# Patient Record
Sex: MALE | Race: WHITE
[De-identification: names, ages, dates, MRNs, and addresses within clinical notes are randomized per-mention and may not be internally consistent; named-entity substitution may affect disease eponyms.]

---

## 2018-02-20 ENCOUNTER — HOSPITAL ENCOUNTER (INPATIENT)
Dept: HOSPITAL 25 - ED | Age: 68
LOS: 1 days | Discharge: HOME | DRG: 312 | End: 2018-02-21
Attending: INTERNAL MEDICINE | Admitting: INTERNAL MEDICINE
Payer: MEDICARE

## 2018-02-20 DIAGNOSIS — Z79.899: ICD-10-CM

## 2018-02-20 DIAGNOSIS — F10.21: ICD-10-CM

## 2018-02-20 DIAGNOSIS — R55: Primary | ICD-10-CM

## 2018-02-20 DIAGNOSIS — R57.9: ICD-10-CM

## 2018-02-20 DIAGNOSIS — Z80.9: ICD-10-CM

## 2018-02-20 DIAGNOSIS — I95.9: ICD-10-CM

## 2018-02-20 DIAGNOSIS — E83.42: ICD-10-CM

## 2018-02-20 DIAGNOSIS — R68.0: ICD-10-CM

## 2018-02-20 DIAGNOSIS — Z91.018: ICD-10-CM

## 2018-02-20 DIAGNOSIS — E87.2: ICD-10-CM

## 2018-02-20 LAB
BASOPHILS # BLD AUTO: 0.1 10^3/UL (ref 0–0.2)
EOSINOPHIL # BLD AUTO: 0.2 10^3/UL (ref 0–0.6)
HCT VFR BLD AUTO: 37 % (ref 42–52)
HGB BLD-MCNC: 13.5 G/DL (ref 14–18)
INR PPP/BLD: 0.9 (ref 0.77–1.02)
LYMPHOCYTES # BLD AUTO: 2.3 10^3/UL (ref 1–4.8)
MCH RBC QN AUTO: 32 PG (ref 27–31)
MCHC RBC AUTO-ENTMCNC: 36 G/DL (ref 31–36)
MCV RBC AUTO: 88 FL (ref 80–94)
MONOCYTES # BLD AUTO: 0.8 10^3/UL (ref 0–0.8)
NEUTROPHILS # BLD AUTO: 5.7 10^3/UL (ref 1.5–7.7)
NRBC # BLD AUTO: 0 10^3/UL
NRBC BLD QL AUTO: 0.1
PLATELET # BLD AUTO: 184 10^3/UL (ref 150–450)
RBC # BLD AUTO: 4.2 10^6/UL (ref 4–5.4)
WBC # BLD AUTO: 9 10^3/UL (ref 3.5–10.8)

## 2018-02-20 PROCEDURE — 87651 STREP A DNA AMP PROBE: CPT

## 2018-02-20 PROCEDURE — 70450 CT HEAD/BRAIN W/O DYE: CPT

## 2018-02-20 PROCEDURE — 93306 TTE W/DOPPLER COMPLETE: CPT

## 2018-02-20 PROCEDURE — 87040 BLOOD CULTURE FOR BACTERIA: CPT

## 2018-02-20 PROCEDURE — 84443 ASSAY THYROID STIM HORMONE: CPT

## 2018-02-20 PROCEDURE — 71275 CT ANGIOGRAPHY CHEST: CPT

## 2018-02-20 PROCEDURE — 85379 FIBRIN DEGRADATION QUANT: CPT

## 2018-02-20 PROCEDURE — 85025 COMPLETE CBC W/AUTO DIFF WBC: CPT

## 2018-02-20 PROCEDURE — 71045 X-RAY EXAM CHEST 1 VIEW: CPT

## 2018-02-20 PROCEDURE — 87493 C DIFF AMPLIFIED PROBE: CPT

## 2018-02-20 PROCEDURE — 81015 MICROSCOPIC EXAM OF URINE: CPT

## 2018-02-20 PROCEDURE — 83880 ASSAY OF NATRIURETIC PEPTIDE: CPT

## 2018-02-20 PROCEDURE — 93005 ELECTROCARDIOGRAM TRACING: CPT

## 2018-02-20 PROCEDURE — 87502 INFLUENZA DNA AMP PROBE: CPT

## 2018-02-20 PROCEDURE — 87641 MR-STAPH DNA AMP PROBE: CPT

## 2018-02-20 PROCEDURE — 86140 C-REACTIVE PROTEIN: CPT

## 2018-02-20 PROCEDURE — 84439 ASSAY OF FREE THYROXINE: CPT

## 2018-02-20 PROCEDURE — 72125 CT NECK SPINE W/O DYE: CPT

## 2018-02-20 PROCEDURE — 85384 FIBRINOGEN ACTIVITY: CPT

## 2018-02-20 PROCEDURE — 80307 DRUG TEST PRSMV CHEM ANLYZR: CPT

## 2018-02-20 PROCEDURE — 94760 N-INVAS EAR/PLS OXIMETRY 1: CPT

## 2018-02-20 PROCEDURE — 36415 COLL VENOUS BLD VENIPUNCTURE: CPT

## 2018-02-20 PROCEDURE — 85652 RBC SED RATE AUTOMATED: CPT

## 2018-02-20 PROCEDURE — 82550 ASSAY OF CK (CPK): CPT

## 2018-02-20 PROCEDURE — 84479 ASSAY OF THYROID (T3 OR T4): CPT

## 2018-02-20 PROCEDURE — 81003 URINALYSIS AUTO W/O SCOPE: CPT

## 2018-02-20 PROCEDURE — 84520 ASSAY OF UREA NITROGEN: CPT

## 2018-02-20 PROCEDURE — 80053 COMPREHEN METABOLIC PANEL: CPT

## 2018-02-20 PROCEDURE — 85730 THROMBOPLASTIN TIME PARTIAL: CPT

## 2018-02-20 PROCEDURE — G0480 DRUG TEST DEF 1-7 CLASSES: HCPCS

## 2018-02-20 PROCEDURE — 80048 BASIC METABOLIC PNL TOTAL CA: CPT

## 2018-02-20 PROCEDURE — 82272 OCCULT BLD FECES 1-3 TESTS: CPT

## 2018-02-20 PROCEDURE — 84484 ASSAY OF TROPONIN QUANT: CPT

## 2018-02-20 PROCEDURE — 85610 PROTHROMBIN TIME: CPT

## 2018-02-20 PROCEDURE — 82565 ASSAY OF CREATININE: CPT

## 2018-02-20 PROCEDURE — 83605 ASSAY OF LACTIC ACID: CPT

## 2018-02-20 PROCEDURE — 83735 ASSAY OF MAGNESIUM: CPT

## 2018-02-20 PROCEDURE — 93880 EXTRACRANIAL BILAT STUDY: CPT

## 2018-02-20 PROCEDURE — 80320 DRUG SCREEN QUANTALCOHOLS: CPT

## 2018-02-20 PROCEDURE — 84145 PROCALCITONIN (PCT): CPT

## 2018-02-20 PROCEDURE — 99283 EMERGENCY DEPT VISIT LOW MDM: CPT

## 2018-02-20 PROCEDURE — 83036 HEMOGLOBIN GLYCOSYLATED A1C: CPT

## 2018-02-20 PROCEDURE — 82533 TOTAL CORTISOL: CPT

## 2018-02-20 RX ADMIN — HEPARIN SODIUM SCH UNITS: 5000 INJECTION INTRAVENOUS; SUBCUTANEOUS at 15:14

## 2018-02-20 RX ADMIN — HEPARIN SODIUM SCH UNITS: 5000 INJECTION INTRAVENOUS; SUBCUTANEOUS at 22:47

## 2018-02-20 NOTE — RAD
Indication: Syncope.



CT of the brain was performed without IV contrast. No prior study is available for

comparison.



Ventricular structures are midline. No midline shift is noted. The extra-axial spaces are

unremarkable.



High density areas are noted in the basal ganglia bilaterally consistent with basal

ganglia calcifications. No intracranial mass or hemorrhage is noted.



Mastoid air cells are well aerated. Paranasal sinuses are clear.



IMPRESSION: NO INTRACRANIAL MASS OR HEMORRHAGE IS NOTED.

## 2018-02-20 NOTE — ED
Haven ZEE Thomas, scribed for Shell Tillman MD on 18 at 1027 .





Syncope/Near Syncope





- HPI Summary


HPI Summary: 





The patient is a 67 year old male brought in by ambulance after a syncopal 

episode that occurred when he was hiking up a hill with his wife. Before the 

syncope, the patient was lightheaded for a few seconds. As a result of the 

syncope, he fell and struck his head. He did not have urinary incontinence. Per 

EMS, the patient was coming out a stuporous state when we arrived. He had dry 

heaves prior to arrival, and he is nauseous, diaphoretic, and lightheaded in 

the emergency department. The patient denies aphasia, chest pain, and a 

headache. EMS tracing does not show a STEMI. He did not have the flu shot this 

year. He is not on any medications except Claritin.  Pt is a recovered 

alcoholic for many years, did have black out spells years ago when he was 

drinking.  Had one relapse a few years ago with alcohol, but has been clean for 

years.  Denies alcohol today or recently.  Pt is a vegan, takes vitamins in 

addition to the claritin.  Pt had also walked 3 miles this am before climbing 3 

hills with his wife.  Pt is used to this level of physical activity.  Pt had 

not felt ill prior to this episode. 





- History Of Current Complaint


Hx Obtained From: Patient


Onset/Duration: Sudden Onset, Lasting Minutes, Still Present - he is still 

nauseous, mental status is improved


Timing: Intermittent Episode Lasting


Context: Witnessed


Activity At Onset: Exertion - Hiking hills


Associated Head Trauma: Yes


Aggravating Factor(s): Nothing


Alleviating Factor(s): Spontaneous Resolution


Associated Signs And Symptoms: Diaphoresis, Head Trauma (Recent), 

Lightheadedness, Other - Dry heaves, nauseous; NEGATIVE: aphasia, chest pain, 

headache





- Allergies/Home Medications


Allergies/Adverse Reactions: 


 Allergies











Allergy/AdvReac Type Severity Reaction Status Date / Time


 


strawberry Allergy Severe severe Verified 18 10:57





   facial/head  





   swelling  


 


ENVIRONMENTAL/SEASONAL Allergy  Sneezing Uncoded 04/15/15 12:50





HAYFEVER     











Home Medications: 


 Home Medications





Desloratidine (NF) [Clarinex (NF)] 5 mg PO EVERY OTHER DAY 18 [History 

Confirmed 18]











PMH/Surg Hx/FS Hx/Imm Hx


Previously Healthy: No - recovered alcoholic 


Endocrine/Hematology History: 


   Denies: Hx Diabetes


Cardiovascular History: 


   Denies: Hx Coronary Artery Disease, Hx Hypertension, Hx Myocardial Infarction


Sensory History: Reports: Hx Contacts or Glasses - READING GLASSES


   Denies: Hx Hearing Aid


Opthamlomology History: Reports: Hx Contacts or Glasses - READING GLASSES


Neurological History: 


   Denies: Hx Seizures


Psychiatric History: Reports: Other Psychiatric Issues/Disorders - Hx 

recovering alcoholic





- Surgical History


Surgery Procedure, Year, and Place:  VASECTOMY, OFFICE.   [place]OS[place], 

[facility] [place]  T[place]illectomy


Hx Anesthesia Reactions: Yes - LOCAL - SENSITVE - FLINCH





- Family History


Known Family History: Positive: Cardiac Disease, Other - Mother  of ovarian 

cancer, father alive and well. 





- Social History


Alcohol Use: None


Alcohol Amount: Recovering alcoholic


Substance Use Type: Reports: None


Smoking Status (MU): Former Smoker


Type: Pipe


Amount Used/How Often: PIPE


Have You Smoked in the Last Year: No





Review of Systems


Positive: Skin Diaphoresis


Negative: Chest Pain


Positive: Nausea, Other - Dry heaves


Negative: incontinence


Neurological: Negative - aphasia, Other - Lightheadedness


Positive: Syncope.  Negative: Headache


All Other Systems Reviewed And Are Negative: Yes





Physical Exam





- Summary


Physical Exam Summary: 





Appearance: mildly Ill-appearing, no pain distress, Well-nourished


Skin: Warm, diaphoretic


Head: Normal Head/Face inspection


Eyes: Conjunctiva clear


ENT: Normal inspection


Neck: Supple, no nodes, no JVD.


Respiratory: Lungs clear, Normal breath sounds, no respiratory distress


Cardio: RRR, No murmur, pulses normal, brisk capillary refill


Abdomen: soft, nontender


Bowel sounds: present 


Rectal: There is normal tone. Soft brown stool is present


Musculoskeletal: Strength Intact/ ROM intact. No calf tenderness. No edema.


Neuro: Alert, muscle tone normal, facial symmetry, speech normal, sensory/motor 

intact 


Psychological: Normal


Triage Information Reviewed: Yes


Vital Signs Reviewed: Yes





Diagnostics





- Laboratory


Result Diagrams: 


 18 10:51





 18 12:40


Lab Statement: Any lab studies that have been ordered have been reviewed, and 

results considered in the medical decision making process.





- Radiology


  ** CXR


Xray Interpretation: No Acute Changes - NO ACTIVE CARDIOPULMONARY DISEASE IS 

NOTED. Dr. Tillman has reviewed this report.


Radiology Interpretation Completed By: Radiologist





- CT


  ** CT C-Spine


CT Interpretation: No Acute Changes - Multilevel degenerative disc disease is 

noted. No fracture is noted. Dr. Tillman has reviewed this report.


CT Interpretation Completed By: Radiologist





  ** CT Brain


CT Interpretation: No Acute Changes - NO INTRACRANIAL MASS OR HEMORRHAGE IS 

NOTED. Dr. Tillman has reviewed this report.


CT Interpretation Completed By: Radiologist





- EKG


  ** 10:18


Cardiac Rate: NL


EKG Rhythm: Sinus Rhythm - at 94 BPM


ST Segment: Non-Specific


Ectopy: None


EKG Interpretation: Normal AVIVCD, Normal QTc (490), normal axis. 


EKG Comparison: Other - No prior EKG to compare.





  ** 11:45


Cardiac Rate: NL


EKG Rhythm: Sinus Rhythm - at 67 BPM


ST Segment: Non-Specific


Ectopy: None


EKG Interpretation: Normal AVIVD, Normal QTc, normal axis. 


EKG Comparison: No Significant Change - compared to previous EKG today at 10:18





Re-Evaluation





- Re-Evaluation


  ** First Eval


Re-Evaluation Time: 10:45


Change: Unchanged


Comment: Per nurse, the patient had a soft normal brown BM with no blood 

streaking.





  ** Second Eval


Re-Evaluation Time: 11:39


Change: Unchanged


Comment: The patient notes some epigastric discomfort. I performed a rectal 

examination. There is normal tone and soft brown stool. We will send for Guiac. 

Blood pressure is 87/52. Pulse is 63 BPM.





  ** Third Eval


Re-Evaluation Time: 11:52


Change: Unchanged


Comment: Blood pressure is 80/50. Rectal temperature is 94.





Course/Dx


Course Of Treatment: Patients medications reviewed this visit. Allergies noted.


Assessment/Plan: The patient is a 67 year old male brought in by ambulance 

after a syncopal episode that occurred when he was hiking up a hill with his 

wife. Before the syncope, the patient was lightheaded for a few seconds. As a 

result of the syncope, he fell and struck his head. He is nauseous, diaphoretic

, and lightheaded in the emergency department. The patient was given IV fluids 

and Zofran. Bloodwork and EKG were obtained. Rapid influenza A and B and Rapid 

Strep are negative. CXR, CT Brain, and CT C-Spine are negative for acute 

disease. Dr. Salgado, hospitalist, will admit the patient.





- Diagnoses


Provider Diagnoses: 


 Syncope, Hypotension, Hypothermia








- Physician Notifications


Discussed Care of Patient With: Nalini Salgado


Time Discussed With Above Provider: 11:43


Instructed by Provider To: Other - Dr. Salgado, hospitalist, will admit the 

patient.





- Critical Care Time


Critical Care Time: 30-74 min





Discharge





- Discharge Plan


Condition: Guarded


Disposition: ADMITTED TO [facility] documentation as recorded by the Haven barrios Thomas accurately reflects 

the service I personally performed and the decisions made by me, Shell Tillman MD.

## 2018-02-20 NOTE — RAD
INDICATION: Carotid stenosis     



COMPARISON: None

 

TECHNIQUE: Transverse and longitudinal scans of the carotid and vertebral arteries were

performed with gray scale, color Doppler, and spectral Doppler imaging. Stenosis criteria

is based on flow velocities that correlate with visual internal carotid artery diameter

(NASCET criteria)



FINDINGS:  Right carotid: There is moderate calcific plaque involving the bifurcation.

There is no spectral broadening. The peak systolic velocity of the internal carotid artery

is 153 cm/s and the peak diastolic velocity 32 cm/s. The ICA/CCA ratio is calculated at

0.8. This corresponds to a less than 50% diameter stenosis.



Left carotid: There is no appreciable plaque involving the bifurcation. There is no

spectral broadening. The peak systolic velocity of the internal carotid artery is 98 cm/s

and the peak diastolic velocity 41 cm/s. The ICA/CCA ratio is calculated at 0.7. This

corresponds to a less than 50% diameter stenosis.



Right vertebral: Right vertebral waveforms are mildly dampened. The vessel is smaller in

caliber than is the left vertebral artery. There is antegrade flow.



Left vertebral: Left vertebral waveforms are normal and the flow is antegrade.



IMPRESSION:  NO EVIDENCE OF A HEMODYNAMICALLY SIGNIFICANT STENOSIS. MODERATE RIGHT-SIDED

CALCIFIC PLAQUE FORMATION. 



CPT II Codes: 3100F PQRS

## 2018-02-20 NOTE — RAD
INDICATION: Chest pain. Short of breath. Evaluate for pulmonary embolus. D-dimer less than

200      



COMPARISON: Chest x-ray February 20, 2018

 

TECHNIQUE: Axial source images were obtained from the thoracic inlet to the hemidiaphragms

following administration of 79 cc Omnipaque 350. CT angiographic technique was utilized.

Coronal and sagittal reconstructed images were acquired.



CHEST FINDINGS:



Neck/thyroid: The visualized neck to include the thyroid appear normal.



Chest wall: There are no acute abnormalities of the bony thorax or chest wall. There is no

supraclavicular, infraclavicular, or axillary lymphadenopathy.



Lungs : There are no pulmonary parenchymal masses or infiltrates. There is minor gravity

dependent atelectasis in the lung bases. The pulmonary interstitium appears normal. There

are no endobronchial lesions.



Cardiomediastinal structures: There is no CT evidence of acute pulmonary embolic disease.



The heart is normal in size. There is no pericardial effusion.  There is no evidence of

aortic aneurysm or dissection. There is no mediastinal or hilar adenopathy. The esophagus

appears normal.



Pleura : There are tiny bilateral pleural effusions.



Other: None.



IMPRESSION:  NO CT EVIDENCE OF ACUTE PULMONARY EMBOLIC DISEASE.. MILD BIBASILAR

HYPOVENTILATION.

## 2018-02-20 NOTE — RAD
Indication: Syncope, neck injury.



CT of the cervical spine was obtained in the axial plane. Sagittal and coronal

reconstructed images were obtained.



The skull base demonstrates no fracture. The C1 ring is grossly intact.



The remainder of the vertebral bodies appear normal in height.



At C2-C3 there is osteophyte formation. No fractures noted. No central or foraminal

stenosis is noted.



C3-C4 spondylitic ridge with left uncovertebral joint and left facet arthropathy resulting

in left foraminal stenosis. No fracture is noted.



At C4-C5 spondylitic ridge with osteophyte formation is noted. Left uncovertebral joint

hypertrophy and left facet arthropathy is noted resulting in left foraminal stenosis.



At C5-C6 spondylitic ridge is noted. No central or foraminal stenosis is noted. Anterior

osteophyte formation is noted.



At C6-C7 spondylitic ridge with disc space narrowing, broad-based protrusion flattens the

thecal sac. No central or foraminal stenosis is noted.



At C7-T1 no disc protrusion is noted.



IMPRESSION: Multilevel degenerative disc disease is noted. No fracture is noted.

## 2018-02-20 NOTE — HP
CC:  Dr. Gonzalez; Dr. Caruso *

 

HISTORY AND PHYSICAL:

 

DATE OF ADMISSION:  02/20/18

 

PRIMARY CARE PROVIDER:  Gildardo Gonzalez MD

 

ATTENDING PHYSICIAN WHILE IN THE HOSPITAL:  Nalini Salgado DO * (report 
dictated by Jae Ortega NP)

 

CHIEF COMPLAINT:  Syncope.

 

HISTORY OF PRESENT ILLNESS:  Mr. Miner is a 67-year-old male patient.  He has a 
history of alcoholism in the past and history of hyperlipidemia that is now 
diet controlled.  He comes into the ED today.  He states he got up at 4:30 
doing his normal routine.  He went for a 3-mile hike.  He did well with this.  
He came home. He had a light breakfast and then he did some light weightlifting 
exercises.  He had a larger breakfast after that and then he wanted to get the 
day going and went for a 6-mile hike with his wife.  While in the midst of him 
doing this hike, he was going uphill for the third time, never had any chest 
pressure, never felt short of breath, he said he just felt a little lightheaded 
and dizzy, did not feel like that he was spinning.  He had no facial drooping, 
no weakness to one side and he collapsed.  It is unclear how long he was down 
for.  There was no chest pain after, when he came to he recognized his wife and 
he knew he got loaded up in the ambulance.  He remembers the ride over here.  
He had no chest pain after.  He states he just feels a little lightheaded now 
and he feels dizzy, but he does not feel like the room is spinning.  He gives 
no history of recent illness.  No fevers. No cough.  No URI symptoms.  He 
denies any feeling of rhinorrhea, sore throat, or feeling stuffed up and he 
states typically he does not have any chest pain.  He states typical blood 
pressure for him is like 110 systolic and to his knowledge, he has no trouble 
with thyroid.  No history of cancers.  No recent rashes or open sores or areas 
like that.  He denies having any vomiting or diarrhea or any abdominal 
discomfort.  He came into the ED.  Initially when he came in, his blood 
pressure was stable; however, while here it is noted that his blood pressure 
did drop down into the 80s systolic and despite 5 liters of fluid it did not 
improve. We were asked to evaluate for admission.  The wife states that he did 
not appear to be confused right after and there was no reports of shaking or 
seizure like activity.

 

PAST MEDICAL HISTORY:  Significant for;

1.  History of alcoholism in the past, last relapse was a year ago.

2.  History of hyperlipidemia.

 

PAST SURGICAL HISTORY:

1.  He has had cataract.

2.  Tonsillectomy.

3.  Vasectomy.

 

HOME MEDICATIONS:  Include Clarinex 5 p.o. every other day.

 

ALLERGIES TO MEDICATIONS:  Include no known drug allergies.

 

FAMILY HISTORY:  His mother had a history of cancer.  His father is still 
alive. He is healthy to his knowledge.

 

SOCIAL HISTORY:  He does not smoke.  He is again former alcoholic.  No 
recreational drugs.  Surrogate decision maker is his wife.

 

REVIEW OF SYSTEMS:  There is no documented fever at home or chills but he does 
admit being hypothermic.  It was noted here that he was hypothermic.  He denied 
having any significant weight change.  He denies having any ear discharge.  He 
denies having any rhinorrhea.  There is no sore throat, no thyroid enlargement. 
Denies having any chest pain.  There is no orthopnea.  There is no nocturnal 
dyspnea.  He denies having any abdominal pain.  There was no nausea, no vomiting
, no dysuria.  There was no frequency, no seizure, no loss of consciousness, no 
pruritus and no skin ulcerations.  Review of 14 systems completed, all others 
negative.

 

                               PHYSICAL EXAMINATION

 

GENERAL:  At this time, Mr. Miner is a 67-year-old male patient.  He is sitting 
in the ED stretcher.  He does not appear to be in any acute distress.  He is 
well- nourished, well-developed.

 

VITAL SIGNS:  Blood pressure now 97/51 on 2 mcg of Levophed.  His temperature 
is 97.5 temporal, but he required Vaibhav hugger and fluid warming and his pulse 
is 65, his respirations were 18.  His O2 saturation was 98%.

 

HEENT:  Head is atraumatic.  Eyes:  Sclerae anicteric and not pale.  Throat:  
Oral mucosa appears to be dry.  No oropharyngeal erythema.

 

NECK: Supple.

 

LUNGS:  Clear to auscultation bilaterally.  No wheezes, rales, or rhonchi.

 

HEART:  Sounds S1 and S2.  Regular rate and rhythm.  No murmurs, rubs, or 
gallops.

 

ABDOMEN:  Soft, it is flat, nontender.  Bowel sounds present.

 

EXTREMITIES:  Pulses were 2+ throughout.  He is moving all 4 extremities with 5/
5 strength.

 

NEUROLOGIC:  The patient is awake.  He is alert.  He is oriented x3.  His 
speech is clear.  His tongue is midline.  Finger-to-nose is intact bilaterally.
  Heel-to-shin is intact bilaterally.  No pronator drift.  He has no gross 
focal deficits.

 

SKIN:  Intact.

 

 LABORATORY DATA/DIAGNOSTIC STUDIES:  WBC of 9, RBC of 4.20, hemoglobin 13.5, 
hematocrit 37, and platelet count 184.  INR 0.9, PTT of 29.3.  D-dimer less 
than 200.  Sodium is 133, potassium 3.6, chloride of 104, bicarb is 19, BUN 13, 
creatinine of 0.77, glucose 160, lactate 2.8, calcium 8.4, magnesium 1.7, total 
bilirubin 0.8, AST 14, ALT 13, alkaline phosphatase was 55.  CK 74, troponins 
0. His BNP is 16.  Total protein 5.2, albumin was 3.4.  Cortisol pending.  TSH 
was normal.  Toxicology is negative.  Serology was negative for flu and strep.

 

He had imaging here in the ED starting out with brain CT, which revealed no 
intracranial mass or hemorrhage noted.  He had cervical spine CT which revealed 
multilevel degenerative disc disease noted.  No fracture identified.  



He had a chest x-ray which revealed no active cardiopulmonary disease.  



There was EKG, initial EKG shows sinus rhythm, rate of 94.  No ST elevations or 
T-wave inversions were noted.  He had this repeat EKG, heart rate was now down 
to 67, would appear to be sinus rhythm.  No ST elevation or T-wave inversions.  
It was a normal rhythm. Old medical records were reviewed.

 

ASSESSMENT AND PLAN:  Mr. Miner is a 67-year-old male patient coming into the 
ED today with complaints of a syncopal episode.  We were asked to evaluate for 
admission.  He will be admitted under inpatient status for:

 

1.  Syncope.  Etiology is unclear.  When he is stable, I would check 
orthostatic blood pressures but right now he has had 5 liters of fluid and his 
blood pressure lying down is noted to be in the 80 systolic.  He was requiring 
2 mcg of Levophed to get him up to the systolics of 97.  I did touch base with 
Dr. Caruso.  We are going to switch him over to Ulises-Synephrine as we are going to 
______ on this peripherally.  I will start him on at 50 mcg per the 
recommendation of Dr. Caruso.  I will get an echo.  We will cycle his troponins.  
We will place him on telemetry and we will continue to follow.

2.  Hypotension with hypothermia.  Again I questioned the etiology of this.  I 
question if there maybe some underlying adrenal insufficiency, possibly an 
occult infection.  He was pancultured.  I am going to give him a one time dose 
of Zosyn. We are going to panculture him.  We will check his urine.  His flu 
swab was negative and we will continue to follow him.  Also checking a 
procalcitonin.  I am going to give him empirically a dose of hydrocortisone to 
see if he does improves the blood pressure.

3.  Lactic acidosis.  Again, this is probably in the setting of hypotension.  
We are going to hydrate him and again panculture him and we will continue to 
follow this serially.

4.  DVT prophylaxis.  Heparin subcu.

5.  History of alcoholism.  Not an active issue.

6.  Hypomagnesemia.  We will replace his magnesium.

7.  Code status.  Full code.

8.  Fluids, electrolytes, and nutrition.  He can have a clear liquid diet.

 

TIME SPENT:  Time spent on the admission was approximately 60 minutes, greater 
than half the time was spent face-to-face with the patient obtaining my history 
of physical; the other half time was spent going over the plan of care with the 
patient and implementing plan of care.  I did discuss the plan of care with my 
attending, Dr. Salgado, who is in agreement.  I am also consulting with Dr. Caruso.

 

 ____________________________________ JAE ORTEGA, ELSI

 

[id number] #: [id number]

JESSICA

## 2018-02-20 NOTE — ECHO
Patient:      DYLAN MOELLER

[facility] Rec#:     [id number]            :          1950          

Date:         2018            Age:          67y                 

Account#:     [id number]          Height:       175.3 cm / 69.0 in

Accession#:   [id number]           Weight:       88.5 kg / 195.1 lbs

Sex:          M                     BSA:          2

Room#:        ICU 4                 

Admit Date#:  2018          

Type:         Inpatient

 

Referring:    Jae Ortega NP

Reading:      Miguel Yoder MD

Sonographer:  Nahed Lei RN RDCS

CC:           Gildardo Gonzalez MD

______________________________________________________________________

 

Transthoracic Echocardiogram

 

Indication:

Syncope, hypotension

BP:           108/68

HR:           63

Rhythm:       NSR

 

Findings     

History:

Dyslipidemia, gout, former smoker, former heavy alcohol use. 

 

Technical Comments:

The study quality is fair.  The study is technically limited due to the

patient's smoking history. The patient was on a vasopressor drip during

the exam. Echo completed at 1530. 

 

Left Ventricle:

The left ventricular chamber size is normal. Septal wall hypertrophy is

observed. Global left ventricular wall motion and contractility are

within normal limits. There is normal left ventricular systolic

function. The estimated ejection fraction is greater than 65%.  There is

an E to A reversal in the mitral valve flow pattern suggestive of

diastolic dysfunction. 

 

Left Atrium:

The left atrial chamber size is normal. 

 

Right Ventricle:

The right ventricle is slightly dilated.  The right ventricular global

systolic function is low normal. 

 

Right Atrium:

The right atrial cavity size is normal. 

 

Aortic Valve:

The aortic valve is trileaflet. The aortic valve leaflets are mildly

thickened. There is no evidence of aortic regurgitation. There is no

evidence of aortic stenosis. 

 

Mitral Valve:

The mitral valve leaflets are mildly thickened. There is trace to mild

mitral regurgitation. There is no evidence of mitral stenosis. 

 

Tricuspid Valve:

The tricuspid valve leaflets are normal.  There is trace tricuspid

regurgitation.  Unable to estimate the right ventricular systolic

pressure.   There is no tricuspid stenosis. 

 

Pulmonic Valve:

The pulmonic valve appears normal. There is mild pulmonic regurgitation.

 There is no pulmonic stenosis.  

 

Pericardium:

There is no significant pericardial effusion. A pericardial fat pad is

visualized. 

 

Aorta:

There is no dilatation of the ascending aorta. There is no dilatation of

the aortic arch. There is mild dilatation of the aortic root. 

 

Pulmonary Artery:

The main pulmonary artery appears normal. 

 

Venous:

The venous system is not well visualized. The inferior vena cava is not

visualized. 

 

Conclusions

Dyslipidemia, gout, former smoker

Global left ventricular wall motion and contractility are within normal

limits.

There is normal left ventricular systolic function.

The estimated ejection fraction is greater than 65%. 

The right ventricular global systolic function is low normal.

There is no evidence of aortic stenosis.

There is trace to mild mitral regurgitation.

There is trace tricuspid regurgitation. 

Unable to estimate the right ventricular systolic pressure.  

There is no significant pericardial effusion.

 

Measurements     

Name                    Value         Normal Range            

RVDdMajor (2D)          4.2 cm        (2.2 - 4.4)             

RAd ISD 4CH             4.3 cm        (3.4 - 4.9)             

RA (A4C)W               4.2 cm        (2.9 - 4.6)             

IVSd (2D)               1.2 cm        (0.6 - 1)               

LVPWd (2D)              1 cm          (0.6 - 1)               

LVIDd (2D)              3.9 cm        (3.6 - 5.4)             

LVIDs (2D)              2.4 cm        -                        

LV FS (2D)              39 %          (25 - 45)               

Aortic Annulus          2.2 cm        (1.4 - 2.6)             

Ao root diameter (2D)   3.6 cm        (2.1 - 3.5)             

Ascending Ao            3.2 cm        (2.1 - 3.4)             

Aortic arch             3 cm          (1.8 - 3.4)             

LA dimension (AP) 2D    3.3 cm        (2.3 - 3.8)             

LAd ISD 4CH             4.2 cm        (2.9 - 5.3)             

LA ISD 4CH W            3.9 cm        (2.5 - 4.5)             

 

Name                    Value         Normal Range            

LA ESV SP 4CH (A/L)     40 ml         -                        

LA ESV SP 2CH (A/L)     44 ml         -                        

LA ESV BP (A/L)         44 ml         -                        

LA ESV BP (A/L) index   21.5 ml/m2    -                        

LA ESV SP 4CH (MOD)     35 ml         -                        

LA ESV SP 2CH (MOD)     43 ml         -                        

 

Name                    Value         Normal Range            

MV E-wave Vmax          0.8 m/sec     -                        

MV deceleration time    195 msec      -                        

MV A-wave Vmax          0.98 m/sec    -                        

MV E:A ratio            0.82 ratio    -                        

LV septal e' Vmax       0.09 m/sec    -                        

LV lateral e' Vmax      0.12 m/sec    -                        

LV E:e' septal ratio    8.9 ratio     -                        

LV E:e' lateral ratio   6.7 ratio     -                        

 

Name                    Value         Normal Range            

AV Vmax                 1.3 m/sec     -                        

AV VTI                  30.4 cm       -                        

AV peak gradient        7.2 mmHg      -                        

AV mean gradient        4.6 mmHg      -                        

LVOT Vmax               1.2 m/sec     -                        

LVOT VTI                27.2 cm       -                        

LVOT peak gradient      5.6 mmHg      -                        

LVOT mean gradient      3.7 mmHg      -                        

PARVEZ Vmax                1.1 m/sec     -                        

 

Name                    Value         Normal Range            

PV Vmax                 0.95 m/sec    -                        

 

Electronically signed by: Miguel Yoder MD on 2018 16:10:58

## 2018-02-20 NOTE — RAD
Indication: Syncope.



Single frontal view of the chest performed at 1054 hours was reviewed.



No prior study is available for comparison.



No mediastinal shift is noted. Heart is of normal size and configuration. Lung fields

appear clear.



IMPRESSION: NO ACTIVE CARDIOPULMONARY DISEASE IS NOTED.

## 2018-02-20 NOTE — CONSULT
Consult


Consult: 





Consultation Note -- Critical Care





Requesting: Jae Ortega


Reason for consult: hypotension


Limitations in history/physical: none


Date of consult: 02/20/2018





HPI: 67y M with no sig past history. Brought to ER by EMS after syncopal 

episode. Patient states he was walking his weekly walk with wife in [place] and 

suddenly felt dizzy and lightheaded and then doesnt remember what happened. As 

per wife, he passed out and started coming to it after EMS arrival. He states 

he had no sob/cp/palpitations/weakness/tingling. No recent fever/chills/n/v. no 

sick contacts though wife did have strept throat recently. He states he eats 

well, sleeps okay and eats vegan. Stopped smoking 35 years back and stopped 

drinking 1 year back but before that had a small relapse but use to drink heavy 

in the past. No recent bug bites or travel. He has said for the past week he 

has felt weak in the evenings and has been sleeping earlier. Of note, his wifes 

mother passed away last week and they have been under some stress. He started 

his day with exercise at 5am today and felt fine all morning. 





In ER, BP was 70-80s, started on IVF bolus. Temp normal but then rectal temp 

94F. Given a total of 4-5 L NS but BP still 80s, started on levophed. He was 

slightly delirious on arrival but improved after IVF. 





EKG demonstrated nsr, no st/t changes, no heart block.





Imaging, CXR, CT brain, CT neck all negative for acute findings.





Admitted to ICU.





ROS: negative except for pertinent positives mentioned above. 





PMHx: none





PSHx: none





Family History: none signficant





Social History: Alcohol-none, past heavy drinker, last drink 1 year back; 

Smoking-stopped 35 yrs back; Drug use-none; Lives with wife





Allergies: strawberry (swelling), seasonal allergy





Home Medications: none; except OTC vitamins and Claritin prn for allergies





Tele:  sinus bradycardia 59





Vitals: 


 Vital Signs











Temp  97.7 F   02/20/18 14:28


 


Pulse  81   02/20/18 14:28


 


Resp  18   02/20/18 14:28


 


BP  100/64   02/20/18 14:15


 


Pulse Ox  93   02/20/18 14:28








 Intake & Output











 02/19/18 02/20/18 02/20/18





 18:59 06:59 18:59


 


Intake Total   2005


 


Output Total   450


 


Balance   1555


 


Weight   0 oz


 


Intake:   


 


  IV Fluids   2005


 


Output:   


 


  Muhammad   450














O2/Vent: RA





Infusions: levophed, changing to ronda





Current Medications:


Acetaminophen (Tylenol Tab*)  650 mg PO Q6H PRN


   PRN Reason: FEVER/PAIN


Heparin Sodium (Porcine) (Heparin Vial(*))  5,000 units SUBCUT Q8HR FERNANDO


Phenylephrine HCl 50 mg/ (Sodium Chloride)  250 mls @ 15 mls/hr IV .(Initial 

Rate) FERNANDO


   PRN Reason: 50 MCG/MIN


   Last Admin: 02/20/18 14:06 Dose:  3 mls/hr


Ondansetron HCl (Zofran Inj*)  4 mg IV Q6H PRN


   PRN Reason: NAUSEA








Physical Exam:


General: awake, alert, no distress, no diaphoresis


Head: normocephalic, atraumatic


HEENT: no pallor, no icterus, moist mucous membranes


Neck: soft, supple, no jvd, no stridor


CVS: normal rate, regular, no murmur


Resp: bilateral air entry, no rhales, no wheeze, no rhonchi, no acc muscle use


Abdomen: soft, nontender, nondistended, bowel sounds present


Ext: pulses+, warm, no edema


Skin: intact, no breakdown, no dryness


Neuro: awake, alert, orientedx3, moving all extremities, no gross focal deficit





Labs: 


 Laboratory Results - last 24 hr











  02/20/18 02/20/18 02/20/18





  10:51 10:51 10:51


 


WBC   


 


RBC   


 


Hgb   


 


Hct   


 


MCV   


 


MCH   


 


MCHC   


 


RDW   


 


Plt Count   


 


MPV   


 


Neut % (Auto)   


 


Lymph % (Auto)   


 


Mono % (Auto)   


 


Eos % (Auto)   


 


Baso % (Auto)   


 


Absolute Neuts (auto)   


 


Absolute Lymphs (auto)   


 


Absolute Monos (auto)   


 


Absolute Eos (auto)   


 


Absolute Basos (auto)   


 


Absolute Nucleated RBC   


 


Nucleated RBC %   


 


INR (Anticoag Therapy)   0.90 


 


APTT   29.3 


 


Fibrinogen   


 


D-Dimer, Quantitative   < 200 


 


Sodium  133  


 


Potassium  3.6  


 


Chloride  104  


 


Carbon Dioxide  19 L  


 


Anion Gap  10  


 


BUN  13  


 


Creatinine  0.77  


 


Est GFR ( Amer)  130.0  


 


Est GFR (Non-Af Amer)  101.1  


 


BUN/Creatinine Ratio  16.9  


 


Glucose  160 H  


 


Hemoglobin A1c   


 


Lactic Acid   


 


Calcium  8.4 L  


 


Magnesium  1.7 L  


 


Total Bilirubin  0.90  


 


AST  14  


 


ALT  13  


 


Alkaline Phosphatase  55  


 


Total Creatine Kinase  74  


 


Troponin I  0.00  


 


C-Reactive Protein   


 


B-Natriuretic Peptide    16


 


Total Protein  5.2 L  


 


Albumin  3.4  


 


Globulin  1.8 L  


 


Albumin/Globulin Ratio  1.9  


 


Procalcitonin   


 


TSH  3.55  


 


Free T4  1.00  


 


Total T3  0.97  


 


Cortisol  29.24  


 


Urine Color   


 


Urine Appearance   


 


Urine pH   


 


Ur Specific Gravity   


 


Urine Protein   


 


Urine Ketones   


 


Urine Blood   


 


Urine Nitrate   


 


Urine Bilirubin   


 


Urine Urobilinogen   


 


Ur Leukocyte Esterase   


 


Urine WBC (Auto)   


 


Urine RBC (Auto)   


 


Urine Bacteria   


 


Urine Glucose   


 


Urine Opiates Screen   


 


Ur Barbiturates Screen   


 


Ur Phencyclidine Scrn   


 


Ur Amphetamines Screen   


 


U Benzodiazepines Scrn   


 


Urine Cocaine Screen   


 


U Cannabinoids Screen   


 


Serum Alcohol  < 10  


 


Influenza A (Rapid)   


 


Influenza B (Rapid)   


 


Group A Strep Rapid   














  02/20/18 02/20/18 02/20/18





  10:51 10:51 10:51


 


WBC  9.0  


 


RBC  4.20  


 


Hgb  13.5 L  


 


Hct  37 L  


 


MCV  88  


 


MCH  32 H  


 


MCHC  36  


 


RDW  13  


 


Plt Count  184  


 


MPV  8  


 


Neut % (Auto)  63.1  


 


Lymph % (Auto)  25.5  


 


Mono % (Auto)  8.8  


 


Eos % (Auto)  1.8  


 


Baso % (Auto)  0.8  


 


Absolute Neuts (auto)  5.7  


 


Absolute Lymphs (auto)  2.3  


 


Absolute Monos (auto)  0.8  


 


Absolute Eos (auto)  0.2  


 


Absolute Basos (auto)  0.1  


 


Absolute Nucleated RBC  0  


 


Nucleated RBC %  0.1  


 


INR (Anticoag Therapy)   


 


APTT   


 


Fibrinogen   


 


D-Dimer, Quantitative   


 


Sodium   


 


Potassium   


 


Chloride   


 


Carbon Dioxide   


 


Anion Gap   


 


BUN   


 


Creatinine   


 


Est GFR ( Amer)   


 


Est GFR (Non-Af Amer)   


 


BUN/Creatinine Ratio   


 


Glucose   


 


Hemoglobin A1c    5.1


 


Lactic Acid   2.8 H* 


 


Calcium   


 


Magnesium   


 


Total Bilirubin   


 


AST   


 


ALT   


 


Alkaline Phosphatase   


 


Total Creatine Kinase   


 


Troponin I   


 


C-Reactive Protein   


 


B-Natriuretic Peptide   


 


Total Protein   


 


Albumin   


 


Globulin   


 


Albumin/Globulin Ratio   


 


Procalcitonin   


 


TSH   


 


Free T4   


 


Total T3   


 


Cortisol   


 


Urine Color   


 


Urine Appearance   


 


Urine pH   


 


Ur Specific Gravity   


 


Urine Protein   


 


Urine Ketones   


 


Urine Blood   


 


Urine Nitrate   


 


Urine Bilirubin   


 


Urine Urobilinogen   


 


Ur Leukocyte Esterase   


 


Urine WBC (Auto)   


 


Urine RBC (Auto)   


 


Urine Bacteria   


 


Urine Glucose   


 


Urine Opiates Screen   


 


Ur Barbiturates Screen   


 


Ur Phencyclidine Scrn   


 


Ur Amphetamines Screen   


 


U Benzodiazepines Scrn   


 


Urine Cocaine Screen   


 


U Cannabinoids Screen   


 


Serum Alcohol   


 


Influenza A (Rapid)   


 


Influenza B (Rapid)   


 


Group A Strep Rapid   














  02/20/18 02/20/18 02/20/18





  11:08 11:09 12:40


 


WBC   


 


RBC   


 


Hgb   


 


Hct   


 


MCV   


 


MCH   


 


MCHC   


 


RDW   


 


Plt Count   


 


MPV   


 


Neut % (Auto)   


 


Lymph % (Auto)   


 


Mono % (Auto)   


 


Eos % (Auto)   


 


Baso % (Auto)   


 


Absolute Neuts (auto)   


 


Absolute Lymphs (auto)   


 


Absolute Monos (auto)   


 


Absolute Eos (auto)   


 


Absolute Basos (auto)   


 


Absolute Nucleated RBC   


 


Nucleated RBC %   


 


INR (Anticoag Therapy)   


 


APTT   


 


Fibrinogen   


 


D-Dimer, Quantitative   


 


Sodium   


 


Potassium   


 


Chloride   


 


Carbon Dioxide   


 


Anion Gap   


 


BUN    12


 


Creatinine    0.72


 


Est GFR ( Amer)    140.5


 


Est GFR (Non-Af Amer)    109.2


 


BUN/Creatinine Ratio   


 


Glucose   


 


Hemoglobin A1c   


 


Lactic Acid   


 


Calcium   


 


Magnesium   


 


Total Bilirubin   


 


AST   


 


ALT   


 


Alkaline Phosphatase   


 


Total Creatine Kinase   


 


Troponin I    0.00


 


C-Reactive Protein    < 1.00


 


B-Natriuretic Peptide   


 


Total Protein   


 


Albumin   


 


Globulin   


 


Albumin/Globulin Ratio   


 


Procalcitonin   


 


TSH   


 


Free T4   


 


Total T3   


 


Cortisol   


 


Urine Color   


 


Urine Appearance   


 


Urine pH   


 


Ur Specific Gravity   


 


Urine Protein   


 


Urine Ketones   


 


Urine Blood   


 


Urine Nitrate   


 


Urine Bilirubin   


 


Urine Urobilinogen   


 


Ur Leukocyte Esterase   


 


Urine WBC (Auto)   


 


Urine RBC (Auto)   


 


Urine Bacteria   


 


Urine Glucose   


 


Urine Opiates Screen   


 


Ur Barbiturates Screen   


 


Ur Phencyclidine Scrn   


 


Ur Amphetamines Screen   


 


U Benzodiazepines Scrn   


 


Urine Cocaine Screen   


 


U Cannabinoids Screen   


 


Serum Alcohol   


 


Influenza A (Rapid)  Negative  


 


Influenza B (Rapid)  Negative  


 


Group A Strep Rapid   Negative 














  02/20/18 02/20/18 02/20/18





  12:40 12:40 13:00


 


WBC   


 


RBC   


 


Hgb   


 


Hct   


 


MCV   


 


MCH   


 


MCHC   


 


RDW   


 


Plt Count   


 


MPV   


 


Neut % (Auto)   


 


Lymph % (Auto)   


 


Mono % (Auto)   


 


Eos % (Auto)   


 


Baso % (Auto)   


 


Absolute Neuts (auto)   


 


Absolute Lymphs (auto)   


 


Absolute Monos (auto)   


 


Absolute Eos (auto)   


 


Absolute Basos (auto)   


 


Absolute Nucleated RBC   


 


Nucleated RBC %   


 


INR (Anticoag Therapy)   


 


APTT   


 


Fibrinogen  284  


 


D-Dimer, Quantitative   


 


Sodium   


 


Potassium   


 


Chloride   


 


Carbon Dioxide   


 


Anion Gap   


 


BUN   


 


Creatinine   


 


Est GFR ( Amer)   


 


Est GFR (Non-Af Amer)   


 


BUN/Creatinine Ratio   


 


Glucose   


 


Hemoglobin A1c   


 


Lactic Acid   


 


Calcium   


 


Magnesium   


 


Total Bilirubin   


 


AST   


 


ALT   


 


Alkaline Phosphatase   


 


Total Creatine Kinase   


 


Troponin I   


 


C-Reactive Protein   


 


B-Natriuretic Peptide   


 


Total Protein   


 


Albumin   


 


Globulin   


 


Albumin/Globulin Ratio   


 


Procalcitonin   < 0.1 


 


TSH   


 


Free T4   


 


Total T3   


 


Cortisol   


 


Urine Color    Yellow


 


Urine Appearance    Clear


 


Urine pH    8.0


 


Ur Specific Gravity    1.010


 


Urine Protein    1+(30 mg/dl) H


 


Urine Ketones    1+ H


 


Urine Blood    Negative


 


Urine Nitrate    Negative


 


Urine Bilirubin    Negative


 


Urine Urobilinogen    Negative


 


Ur Leukocyte Esterase    Negative


 


Urine WBC (Auto)    Trace(0-5/hpf)


 


Urine RBC (Auto)    Trace(0-2/hpf)


 


Urine Bacteria    Absent


 


Urine Glucose    Negative


 


Urine Opiates Screen   


 


Ur Barbiturates Screen   


 


Ur Phencyclidine Scrn   


 


Ur Amphetamines Screen   


 


U Benzodiazepines Scrn   


 


Urine Cocaine Screen   


 


U Cannabinoids Screen   


 


Serum Alcohol   


 


Influenza A (Rapid)   


 


Influenza B (Rapid)   


 


Group A Strep Rapid   














  02/20/18 02/20/18





  13:00 13:52


 


WBC  


 


RBC  


 


Hgb  


 


Hct  


 


MCV  


 


MCH  


 


MCHC  


 


RDW  


 


Plt Count  


 


MPV  


 


Neut % (Auto)  


 


Lymph % (Auto)  


 


Mono % (Auto)  


 


Eos % (Auto)  


 


Baso % (Auto)  


 


Absolute Neuts (auto)  


 


Absolute Lymphs (auto)  


 


Absolute Monos (auto)  


 


Absolute Eos (auto)  


 


Absolute Basos (auto)  


 


Absolute Nucleated RBC  


 


Nucleated RBC %  


 


INR (Anticoag Therapy)  


 


APTT  


 


Fibrinogen  


 


D-Dimer, Quantitative  


 


Sodium  


 


Potassium  


 


Chloride  


 


Carbon Dioxide  


 


Anion Gap  


 


BUN  


 


Creatinine  


 


Est GFR ( Amer)  


 


Est GFR (Non-Af Amer)  


 


BUN/Creatinine Ratio  


 


Glucose  


 


Hemoglobin A1c  


 


Lactic Acid   2.0


 


Calcium  


 


Magnesium  


 


Total Bilirubin  


 


AST  


 


ALT  


 


Alkaline Phosphatase  


 


Total Creatine Kinase  


 


Troponin I  


 


C-Reactive Protein  


 


B-Natriuretic Peptide  


 


Total Protein  


 


Albumin  


 


Globulin  


 


Albumin/Globulin Ratio  


 


Procalcitonin  


 


TSH  


 


Free T4  


 


Total T3  


 


Cortisol  


 


Urine Color  


 


Urine Appearance  


 


Urine pH  


 


Ur Specific Gravity  


 


Urine Protein  


 


Urine Ketones  


 


Urine Blood  


 


Urine Nitrate  


 


Urine Bilirubin  


 


Urine Urobilinogen  


 


Ur Leukocyte Esterase  


 


Urine WBC (Auto)  


 


Urine RBC (Auto)  


 


Urine Bacteria  


 


Urine Glucose  


 


Urine Opiates Screen  None detected 


 


Ur Barbiturates Screen  None detected 


 


Ur Phencyclidine Scrn  None detected 


 


Ur Amphetamines Screen  None detected 


 


U Benzodiazepines Scrn  None detected 


 


Urine Cocaine Screen  None detected 


 


U Cannabinoids Screen  None detected 


 


Serum Alcohol  


 


Influenza A (Rapid)  


 


Influenza B (Rapid)  


 


Group A Strep Rapid  











Imaging: 


cxr 2/20 - no acute process


ct brain 2/20 - no acute process


ct neck 2/20 - negative


ekg 2/20 - nsr, no st/t changes








Assessment: 67y M with no sig past history. Brought to ER by EMS after syncopal 

episode. Patient states he was walking his weekly walk with wife in [place] and 

suddenly felt dizzy and lightheaded and then doesnt remember what happened. in 

ER, hypotensive 70-80s, given IVF bolus, started on pressors after 4-5L and no 

response. 





-Syncopal episode


-Shock, unclear etiology; not clearly septic, not hypovolemic; r/o cardiogenic 

vs anaphylactic/distributive











Plan:


Neuro- stable. no focal or abnormal neurological findings noted.





CVS- shock, on levo. switching to ronda peripherally. BP now 100-110 systolic, 

MAPs 70s. mental status improved. LA trending down from 2.8. repleted Mg in ER 

via IV. Pending ECHO to eval cardiac function. blood cultures done. given zosyn 

x1. clinical suspicion less for sepsis. will continue iv abx till cultures 

return. hydrocortisone given, but cortisol normal, will d/c for now. may even 

consider some SQ epi to see if any anaphylaxis response. Tele monitoring for 

arrythmia. pepcid IV. will continue hydrocortisone for 24 hours.


 


Resp- RA, no distress. no wheezing. cxr clear.





ID- hypothermic, wbc 9. no clear source of infection. s/p zosyn x1, blood 

cultures sent. cxr no infiltrate. 





GI- regular diet.





Renal- Cr okay, metabolic acidosis+, elevated LA+. K 3.6, replete with PO. s/p 4

-5L NS. hold IVF now. 





Heme- hg stable, plt okay. not on AC.





Endo- fingersticks as needed. check hba1c


Musculsk- pressure ulcer proph. bedrest


Wounds- none


Nutrition- regular diet





DVT prophylaxis: heparin sq


GI prophylaxis: pepcid


Central Line: -


Arterial Line: -


Muhammad Cathetor: -





Disposition: ICU





Code Status: full code





Total Critical Care time is 40 minutes, excluding procedures/teaching





Lit Caruso MD


Intensivist


(Electronically Signed)

## 2018-02-21 VITALS — DIASTOLIC BLOOD PRESSURE: 69 MMHG | SYSTOLIC BLOOD PRESSURE: 109 MMHG

## 2018-02-21 LAB
BASOPHILS # BLD AUTO: 0.1 10^3/UL (ref 0–0.2)
EOSINOPHIL # BLD AUTO: 0.1 10^3/UL (ref 0–0.6)
HCT VFR BLD AUTO: 36 % (ref 42–52)
HGB BLD-MCNC: 12.9 G/DL (ref 14–18)
LYMPHOCYTES # BLD AUTO: 1.9 10^3/UL (ref 1–4.8)
MCH RBC QN AUTO: 32 PG (ref 27–31)
MCHC RBC AUTO-ENTMCNC: 36 G/DL (ref 31–36)
MCV RBC AUTO: 90 FL (ref 80–94)
MONOCYTES # BLD AUTO: 1.2 10^3/UL (ref 0–0.8)
NEUTROPHILS # BLD AUTO: 5.9 10^3/UL (ref 1.5–7.7)
NRBC # BLD AUTO: 0 10^3/UL
NRBC BLD QL AUTO: 0
PLATELET # BLD AUTO: 183 10^3/UL (ref 150–450)
RBC # BLD AUTO: 4.01 10^6/UL (ref 4–5.4)
WBC # BLD AUTO: 9.1 10^3/UL (ref 3.5–10.8)

## 2018-02-21 RX ADMIN — HEPARIN SODIUM SCH UNITS: 5000 INJECTION INTRAVENOUS; SUBCUTANEOUS at 07:41

## 2018-02-21 RX ADMIN — HEPARIN SODIUM SCH: 5000 INJECTION INTRAVENOUS; SUBCUTANEOUS at 15:59

## 2018-02-21 NOTE — DS
Discharge Summary





Patient Name: Abram Miner


Medical Record Number: [id number]


Date of Admission: 02/20/2018


Date of Discharge: 02/21/2018


Attending: Dr Lit Caruos (intensivist)


Consultants: -





Admitting Diagnoses: 


1) Syncope


2) Hypotension





Discharge Diagnoses: 


1) Syncope, possible vasovagal versus arrhythmia





HPI/Hospital Course: 


67y M with no sig past history. Brought to ER by EMS after syncopal episode. 

Patient states he was walking his weekly walk with wife in [place] and suddenly 

felt dizzy and lightheaded and then doesnt remember what happened. As per wife

, he passed out and started coming to it after EMS arrival. He states he had no 

sob/cp/palpitations/weakness/tingling. No recent fever/chills/n/v. no sick 

contacts though wife did have strept throat recently. He states he eats well, 

sleeps okay and eats vegan. Stopped smoking 35 years back and stopped drinking 

1 year back but before that had a small relapse but use to drink heavy in the 

past. No recent bug bites or travel. He has said for the past week he has felt 

weak in the evenings and has been sleeping earlier. Of note, his wifes mother 

passed away last week and they have been under some stress. He started his day 

with exercise at 5am today and felt fine all morning. He was admitted for ICU 

for hypotension, transiently on neosynephrine IV but weaned off rapidly. Lactic 

acid improved. Transthoracic echo was performed demonstrating no acute LV 

dysfunction, only mild RV dilatation, no acute valvular abnormality, no 

pericardial effusion. NO acute infectious etiology was noted. CT of the Chest 

demonstrated no acute pulmonary embolus. Carotid duplex showed only moderate 

right carotid plaque. 





I contacted cardiology who agreed that a loop recorder would be the best option 

and would be placed outpatient.





Patient has been hemodyn stable, orthostatics negative. BP stable, Heart rates 

sinus in the 60-80s, on room air. He has walked the unit without any difficulty 

and eating well, feels great. Discussed with patient and he is okay for 

discharge home with followup to Cardiology. 





Cause of syncope is unclear, r/o arrythmia versus vasovagal episode.





Procedures/Imaging: transthoracic echocardiogram, CT chest, Carotid duplex





Laboratory/Data: see chart





Discharge Medications:  none





Diet: heart healthy, low Na, low fat.





Activity: as tolerated





Condition upon discharge: stable/improved





Disposition:  discharge to Home





Code Status: full code





Follow-up: with PCP Dr Keene. Follow up with office of Dr Gracy Mackey for 

scheduling of loop recorder placement. Phone number has been given.








Total Discharge time >30minutes





Lit Caruso MD


Intensivist


(Electronically Signed)

## 2018-02-21 NOTE — PN
Progress Note





- Progress Note


Date of Service: 02/21/18


Note: 





Progress Note -- Critical Care





24 hours events:


-admitted yesterday; weaned off pressors quickly


-awake, alert, no tele events overnight, some episodes of raymon to 60s only. BP 

stable


-no sob/cp/n/v/headache


-CT chest neg for PE


-carotids done, mod right plaque only





Tele:  sinus bradycardia at times to upper 50s, currently 60s





Vitals: 


 Vital Signs











Temp  98.6 F   02/21/18 07:00


 


Pulse  76   02/21/18 07:59


 


Resp  20   02/21/18 07:00


 


BP  134/77   02/21/18 07:59


 


Pulse Ox  96   02/21/18 07:00








 Intake & Output











 02/20/18 02/21/18 02/21/18





 18:59 06:59 18:59


 


Intake Total 2038 444 


 


Output Total 1200 2505 


 


Balance 838 -2061 


 


Weight 210 lb 210 lb 1.608 oz 


 


Intake:   


 


  IV Fluids 2038 16 


 


    NS (0.9%) 33 16 


 


  IVPB  28 


 


    NS (0.9%)  28 


 


  Oral  400 


 


Output:   


 


  Muhammad 1200 2505 














O2/Vent: RA





Infusions: heplock





Current Medications


Acetaminophen (Tylenol Tab*)  650 mg PO Q6H PRN


   PRN Reason: FEVER/PAIN


Famotidine (Pepcid Iv*)  20 mg IV SLOW PU DAILY [facility]


   Last Admin: 02/21/18 07:42 Dose:  Not Given


Heparin Sodium (Porcine) (Heparin Vial(*))  5,000 units SUBCUT Q8HR [facility]


   Last Admin: 02/21/18 07:41 Dose:  5,000 units


Phenylephrine HCl 50 mg/ (Sodium Chloride)  250 mls @ 15 mls/hr IV .(Initial 

Rate) [facility]


   PRN Reason: 50 MCG/MIN


   Last Admin: 02/20/18 14:06 Dose:  3 mls/hr


Ondansetron HCl (Zofran Inj*)  4 mg IV Q6H PRN


   PRN Reason: NAUSEA











Physical Exam:


General: awake, alert, no distress, no diaphoresis


Head: normocephalic, atraumatic


HEENT: no pallor, no icterus, moist mucous membranes


Neck: soft, supple, no jvd, no stridor


CVS: normal rate, regular, no murmur


Resp: bilateral air entry, no rhales, no wheeze, no rhonchi, no acc muscle use


Abdomen: soft, nontender, nondistended, bowel sounds present


Ext: pulses+, warm, no edema


Skin: intact, no breakdown, no dryness


Neuro: awake, alert, orientedx3, moving all extremities, no gross focal deficit





Labs: 


 Laboratory Results - last 24 hr











  02/20/18 02/20/18 02/20/18





  10:51 10:51 10:51


 


WBC   


 


RBC   


 


Hgb   


 


Hct   


 


MCV   


 


MCH   


 


MCHC   


 


RDW   


 


Plt Count   


 


MPV   


 


Neut % (Auto)   


 


Lymph % (Auto)   


 


Mono % (Auto)   


 


Eos % (Auto)   


 


Baso % (Auto)   


 


Absolute Neuts (auto)   


 


Absolute Lymphs (auto)   


 


Absolute Monos (auto)   


 


Absolute Eos (auto)   


 


Absolute Basos (auto)   


 


Absolute Nucleated RBC   


 


Nucleated RBC %   


 


ESR   


 


INR (Anticoag Therapy)   0.90 


 


APTT   29.3 


 


Fibrinogen   


 


D-Dimer, Quantitative   < 200 


 


Sodium  133  


 


Potassium  3.6  


 


Chloride  104  


 


Carbon Dioxide  19 L  


 


Anion Gap  10  


 


BUN  13  


 


Creatinine  0.77  


 


Est GFR ( Amer)  130.0  


 


Est GFR (Non-Af Amer)  101.1  


 


BUN/Creatinine Ratio  16.9  


 


Glucose  160 H  


 


Hemoglobin A1c   


 


Lactic Acid   


 


Calcium  8.4 L  


 


Magnesium  1.7 L  


 


Total Bilirubin  0.90  


 


AST  14  


 


ALT  13  


 


Alkaline Phosphatase  55  


 


Total Creatine Kinase  74  


 


Troponin I  0.00  


 


C-Reactive Protein   


 


B-Natriuretic Peptide    16


 


Total Protein  5.2 L  


 


Albumin  3.4  


 


Globulin  1.8 L  


 


Albumin/Globulin Ratio  1.9  


 


Procalcitonin   


 


TSH  3.55  


 


Free T4  1.00  


 


Total T3  0.97  


 


Cortisol  29.24  


 


Urine Color   


 


Urine Appearance   


 


Urine pH   


 


Ur Specific Gravity   


 


Urine Protein   


 


Urine Ketones   


 


Urine Blood   


 


Urine Nitrate   


 


Urine Bilirubin   


 


Urine Urobilinogen   


 


Ur Leukocyte Esterase   


 


Urine WBC (Auto)   


 


Urine RBC (Auto)   


 


Urine Bacteria   


 


Urine Glucose   


 


Urine Opiates Screen   


 


Ur Barbiturates Screen   


 


Ur Phencyclidine Scrn   


 


Ur Amphetamines Screen   


 


U Benzodiazepines Scrn   


 


Urine Cocaine Screen   


 


U Cannabinoids Screen   


 


Serum Alcohol  < 10  


 


Influenza A (Rapid)   


 


Influenza B (Rapid)   


 


Group A Strep Rapid   














  02/20/18 02/20/18 02/20/18





  10:51 10:51 10:51


 


WBC  9.0  


 


RBC  4.20  


 


Hgb  13.5 L  


 


Hct  37 L  


 


MCV  88  


 


MCH  32 H  


 


MCHC  36  


 


RDW  13  


 


Plt Count  184  


 


MPV  8  


 


Neut % (Auto)  63.1  


 


Lymph % (Auto)  25.5  


 


Mono % (Auto)  8.8  


 


Eos % (Auto)  1.8  


 


Baso % (Auto)  0.8  


 


Absolute Neuts (auto)  5.7  


 


Absolute Lymphs (auto)  2.3  


 


Absolute Monos (auto)  0.8  


 


Absolute Eos (auto)  0.2  


 


Absolute Basos (auto)  0.1  


 


Absolute Nucleated RBC  0  


 


Nucleated RBC %  0.1  


 


ESR   


 


INR (Anticoag Therapy)   


 


APTT   


 


Fibrinogen   


 


D-Dimer, Quantitative   


 


Sodium   


 


Potassium   


 


Chloride   


 


Carbon Dioxide   


 


Anion Gap   


 


BUN   


 


Creatinine   


 


Est GFR ( Amer)   


 


Est GFR (Non-Af Amer)   


 


BUN/Creatinine Ratio   


 


Glucose   


 


Hemoglobin A1c    5.1


 


Lactic Acid   2.8 H* 


 


Calcium   


 


Magnesium   


 


Total Bilirubin   


 


AST   


 


ALT   


 


Alkaline Phosphatase   


 


Total Creatine Kinase   


 


Troponin I   


 


C-Reactive Protein   


 


B-Natriuretic Peptide   


 


Total Protein   


 


Albumin   


 


Globulin   


 


Albumin/Globulin Ratio   


 


Procalcitonin   


 


TSH   


 


Free T4   


 


Total T3   


 


Cortisol   


 


Urine Color   


 


Urine Appearance   


 


Urine pH   


 


Ur Specific Gravity   


 


Urine Protein   


 


Urine Ketones   


 


Urine Blood   


 


Urine Nitrate   


 


Urine Bilirubin   


 


Urine Urobilinogen   


 


Ur Leukocyte Esterase   


 


Urine WBC (Auto)   


 


Urine RBC (Auto)   


 


Urine Bacteria   


 


Urine Glucose   


 


Urine Opiates Screen   


 


Ur Barbiturates Screen   


 


Ur Phencyclidine Scrn   


 


Ur Amphetamines Screen   


 


U Benzodiazepines Scrn   


 


Urine Cocaine Screen   


 


U Cannabinoids Screen   


 


Serum Alcohol   


 


Influenza A (Rapid)   


 


Influenza B (Rapid)   


 


Group A Strep Rapid   














  02/20/18 02/20/18 02/20/18





  11:08 11:09 12:40


 


WBC   


 


RBC   


 


Hgb   


 


Hct   


 


MCV   


 


MCH   


 


MCHC   


 


RDW   


 


Plt Count   


 


MPV   


 


Neut % (Auto)   


 


Lymph % (Auto)   


 


Mono % (Auto)   


 


Eos % (Auto)   


 


Baso % (Auto)   


 


Absolute Neuts (auto)   


 


Absolute Lymphs (auto)   


 


Absolute Monos (auto)   


 


Absolute Eos (auto)   


 


Absolute Basos (auto)   


 


Absolute Nucleated RBC   


 


Nucleated RBC %   


 


ESR   


 


INR (Anticoag Therapy)   


 


APTT   


 


Fibrinogen   


 


D-Dimer, Quantitative   


 


Sodium   


 


Potassium   


 


Chloride   


 


Carbon Dioxide   


 


Anion Gap   


 


BUN    12


 


Creatinine    0.72


 


Est GFR ( Amer)    140.5


 


Est GFR (Non-Af Amer)    109.2


 


BUN/Creatinine Ratio   


 


Glucose   


 


Hemoglobin A1c   


 


Lactic Acid   


 


Calcium   


 


Magnesium   


 


Total Bilirubin   


 


AST   


 


ALT   


 


Alkaline Phosphatase   


 


Total Creatine Kinase   


 


Troponin I    0.00


 


C-Reactive Protein    < 1.00


 


B-Natriuretic Peptide   


 


Total Protein   


 


Albumin   


 


Globulin   


 


Albumin/Globulin Ratio   


 


Procalcitonin   


 


TSH   


 


Free T4   


 


Total T3   


 


Cortisol   


 


Urine Color   


 


Urine Appearance   


 


Urine pH   


 


Ur Specific Gravity   


 


Urine Protein   


 


Urine Ketones   


 


Urine Blood   


 


Urine Nitrate   


 


Urine Bilirubin   


 


Urine Urobilinogen   


 


Ur Leukocyte Esterase   


 


Urine WBC (Auto)   


 


Urine RBC (Auto)   


 


Urine Bacteria   


 


Urine Glucose   


 


Urine Opiates Screen   


 


Ur Barbiturates Screen   


 


Ur Phencyclidine Scrn   


 


Ur Amphetamines Screen   


 


U Benzodiazepines Scrn   


 


Urine Cocaine Screen   


 


U Cannabinoids Screen   


 


Serum Alcohol   


 


Influenza A (Rapid)  Negative  


 


Influenza B (Rapid)  Negative  


 


Group A Strep Rapid   Negative 














  02/20/18 02/20/18 02/20/18





  12:40 12:40 13:00


 


WBC   


 


RBC   


 


Hgb   


 


Hct   


 


MCV   


 


MCH   


 


MCHC   


 


RDW   


 


Plt Count   


 


MPV   


 


Neut % (Auto)   


 


Lymph % (Auto)   


 


Mono % (Auto)   


 


Eos % (Auto)   


 


Baso % (Auto)   


 


Absolute Neuts (auto)   


 


Absolute Lymphs (auto)   


 


Absolute Monos (auto)   


 


Absolute Eos (auto)   


 


Absolute Basos (auto)   


 


Absolute Nucleated RBC   


 


Nucleated RBC %   


 


ESR   


 


INR (Anticoag Therapy)   


 


APTT   


 


Fibrinogen  284  


 


D-Dimer, Quantitative   


 


Sodium   


 


Potassium   


 


Chloride   


 


Carbon Dioxide   


 


Anion Gap   


 


BUN   


 


Creatinine   


 


Est GFR ( Amer)   


 


Est GFR (Non-Af Amer)   


 


BUN/Creatinine Ratio   


 


Glucose   


 


Hemoglobin A1c   


 


Lactic Acid   


 


Calcium   


 


Magnesium   


 


Total Bilirubin   


 


AST   


 


ALT   


 


Alkaline Phosphatase   


 


Total Creatine Kinase   


 


Troponin I   


 


C-Reactive Protein   


 


B-Natriuretic Peptide   


 


Total Protein   


 


Albumin   


 


Globulin   


 


Albumin/Globulin Ratio   


 


Procalcitonin   < 0.1 


 


TSH   


 


Free T4   


 


Total T3   


 


Cortisol   


 


Urine Color    Yellow


 


Urine Appearance    Clear


 


Urine pH    8.0


 


Ur Specific Gravity    1.010


 


Urine Protein    1+(30 mg/dl) H


 


Urine Ketones    1+ H


 


Urine Blood    Negative


 


Urine Nitrate    Negative


 


Urine Bilirubin    Negative


 


Urine Urobilinogen    Negative


 


Ur Leukocyte Esterase    Negative


 


Urine WBC (Auto)    Trace(0-5/hpf)


 


Urine RBC (Auto)    Trace(0-2/hpf)


 


Urine Bacteria    Absent


 


Urine Glucose    Negative


 


Urine Opiates Screen   


 


Ur Barbiturates Screen   


 


Ur Phencyclidine Scrn   


 


Ur Amphetamines Screen   


 


U Benzodiazepines Scrn   


 


Urine Cocaine Screen   


 


U Cannabinoids Screen   


 


Serum Alcohol   


 


Influenza A (Rapid)   


 


Influenza B (Rapid)   


 


Group A Strep Rapid   














  02/20/18 02/20/18 02/20/18





  13:00 13:52 15:13


 


WBC   


 


RBC   


 


Hgb   


 


Hct   


 


MCV   


 


MCH   


 


MCHC   


 


RDW   


 


Plt Count   


 


MPV   


 


Neut % (Auto)   


 


Lymph % (Auto)   


 


Mono % (Auto)   


 


Eos % (Auto)   


 


Baso % (Auto)   


 


Absolute Neuts (auto)   


 


Absolute Lymphs (auto)   


 


Absolute Monos (auto)   


 


Absolute Eos (auto)   


 


Absolute Basos (auto)   


 


Absolute Nucleated RBC   


 


Nucleated RBC %   


 


ESR   


 


INR (Anticoag Therapy)   


 


APTT   


 


Fibrinogen   


 


D-Dimer, Quantitative   


 


Sodium   


 


Potassium   


 


Chloride   


 


Carbon Dioxide   


 


Anion Gap   


 


BUN   


 


Creatinine   


 


Est GFR ( Amer)   


 


Est GFR (Non-Af Amer)   


 


BUN/Creatinine Ratio   


 


Glucose   


 


Hemoglobin A1c   


 


Lactic Acid   2.0 


 


Calcium   


 


Magnesium   


 


Total Bilirubin   


 


AST   


 


ALT   


 


Alkaline Phosphatase   


 


Total Creatine Kinase   


 


Troponin I    0.02


 


C-Reactive Protein   


 


B-Natriuretic Peptide   


 


Total Protein   


 


Albumin   


 


Globulin   


 


Albumin/Globulin Ratio   


 


Procalcitonin   


 


TSH   


 


Free T4   


 


Total T3   


 


Cortisol   


 


Urine Color   


 


Urine Appearance   


 


Urine pH   


 


Ur Specific Gravity   


 


Urine Protein   


 


Urine Ketones   


 


Urine Blood   


 


Urine Nitrate   


 


Urine Bilirubin   


 


Urine Urobilinogen   


 


Ur Leukocyte Esterase   


 


Urine WBC (Auto)   


 


Urine RBC (Auto)   


 


Urine Bacteria   


 


Urine Glucose   


 


Urine Opiates Screen  None detected  


 


Ur Barbiturates Screen  None detected  


 


Ur Phencyclidine Scrn  None detected  


 


Ur Amphetamines Screen  None detected  


 


U Benzodiazepines Scrn  None detected  


 


Urine Cocaine Screen  None detected  


 


U Cannabinoids Screen  None detected  


 


Serum Alcohol   


 


Influenza A (Rapid)   


 


Influenza B (Rapid)   


 


Group A Strep Rapid   














  02/21/18 02/21/18 02/21/18





  05:10 05:10 05:10


 


WBC   9.1 


 


RBC   4.01 


 


Hgb   12.9 L 


 


Hct   36 L 


 


MCV   90 


 


MCH   32 H 


 


MCHC   36 


 


RDW   13 


 


Plt Count   183 


 


MPV   8 


 


Neut % (Auto)   64.8 


 


Lymph % (Auto)   20.4 L 


 


Mono % (Auto)   13.2 H 


 


Eos % (Auto)   0.8 


 


Baso % (Auto)   0.8 


 


Absolute Neuts (auto)   5.9 


 


Absolute Lymphs (auto)   1.9 


 


Absolute Monos (auto)   1.2 H 


 


Absolute Eos (auto)   0.1 


 


Absolute Basos (auto)   0.1 


 


Absolute Nucleated RBC   0 


 


Nucleated RBC %   0 


 


ESR   11 


 


INR (Anticoag Therapy)   


 


APTT   


 


Fibrinogen   


 


D-Dimer, Quantitative   


 


Sodium  135  


 


Potassium  3.7  


 


Chloride  109  


 


Carbon Dioxide  21 L  


 


Anion Gap  5  


 


BUN  8  


 


Creatinine  0.69  


 


Est GFR ( Amer)  147.1  


 


Est GFR (Non-Af Amer)  114.4  


 


BUN/Creatinine Ratio  11.6  


 


Glucose  93  


 


Hemoglobin A1c   


 


Lactic Acid    0.5


 


Calcium  8.4 L  


 


Magnesium  2.0  


 


Total Bilirubin   


 


AST   


 


ALT   


 


Alkaline Phosphatase   


 


Total Creatine Kinase   


 


Troponin I   


 


C-Reactive Protein   


 


B-Natriuretic Peptide   


 


Total Protein   


 


Albumin   


 


Globulin   


 


Albumin/Globulin Ratio   


 


Procalcitonin   


 


TSH   


 


Free T4   


 


Total T3   


 


Cortisol   


 


Urine Color   


 


Urine Appearance   


 


Urine pH   


 


Ur Specific Gravity   


 


Urine Protein   


 


Urine Ketones   


 


Urine Blood   


 


Urine Nitrate   


 


Urine Bilirubin   


 


Urine Urobilinogen   


 


Ur Leukocyte Esterase   


 


Urine WBC (Auto)   


 


Urine RBC (Auto)   


 


Urine Bacteria   


 


Urine Glucose   


 


Urine Opiates Screen   


 


Ur Barbiturates Screen   


 


Ur Phencyclidine Scrn   


 


Ur Amphetamines Screen   


 


U Benzodiazepines Scrn   


 


Urine Cocaine Screen   


 


U Cannabinoids Screen   


 


Serum Alcohol   


 


Influenza A (Rapid)   


 


Influenza B (Rapid)   


 


Group A Strep Rapid   














Imaging: 


cxr 2/20 - no acute process


ct brain 2/20 - no acute process


ct neck 2/20 - negative


ekg 2/20 - nsr, no st/t changes


CT chest 2/20 - no PE


Carotid duplex - right moderate plaque


ECHO 2/20 - LV fxn normal. mild RV dilatation, but normal systolic function.








Assessment: 67y M with no sig past history. Brought to ER by EMS after syncopal 

episode. Patient states he was walking his weekly walk with wife in [place] and 

suddenly felt dizzy and lightheaded and then doesnt remember what happened. in 

ER, hypotensive 70-80s, given IVF bolus, started on pressors after 4-5L and no 

response. 





-Syncopal episode


-Shock, unclear etiology; resolved











Plan:


Neuro- stable. no focal or abnormal neurological findings noted.





CVS- Shock resolved, off pressors. NSR< intermittent raymon only. No arrythmias 

overnight. trop neg, ekg neg. CT chest neg, Carotids only mod right plaque. 

afebrile. unclear etiology of syncope and hypotension. vasovagal? arrythmia? 

not on BB or antihypertensives. will have cardiology evaluate patient, likely 

will need outpatient f/u. consider holter or loop recorder outpatient.


 


Resp- RA, no distress





ID- afebrile now, normal temp. wbc normal. no clear source of infection. s/p 

zosyn x1, blood cultures sent. cxr no infiltrate. 





GI- regular diet.





Renal- Cr okay, metabolic acidosis improving, LA normalized. K 3.7, replete 

with PO. no IVF, euvolemic appearing.





Heme- hg stable, plt okay. not on AC.





Endo- fingersticks as needed. check hba1c


Musculsk- pressure ulcer proph. bedrest, oob to chair


Wounds- none


Nutrition- regular diet





DVT prophylaxis: heparin sq


GI prophylaxis: pepcid


Central Line: -


Arterial Line: -


Muhammad Cathetor: -





Disposition: can transfer to monitored bed/tele





Code Status: full code








Lit Caruso MD


Intensivist


(Electronically Signed)

## 2018-06-12 ENCOUNTER — HOSPITAL ENCOUNTER (OUTPATIENT)
Dept: HOSPITAL 25 - CHICATH | Age: 68
Discharge: HOME | End: 2018-06-12
Attending: INTERNAL MEDICINE
Payer: MEDICARE

## 2018-06-12 VITALS — SYSTOLIC BLOOD PRESSURE: 107 MMHG | DIASTOLIC BLOOD PRESSURE: 58 MMHG

## 2018-06-12 DIAGNOSIS — R55: ICD-10-CM

## 2018-06-12 DIAGNOSIS — I25.10: Primary | ICD-10-CM

## 2018-06-12 DIAGNOSIS — E78.5: ICD-10-CM

## 2018-06-12 DIAGNOSIS — Z87.891: ICD-10-CM

## 2018-06-12 DIAGNOSIS — I95.9: ICD-10-CM

## 2018-06-12 DIAGNOSIS — I34.0: ICD-10-CM

## 2018-06-12 DIAGNOSIS — M10.9: ICD-10-CM

## 2018-06-12 PROCEDURE — 93458 L HRT ARTERY/VENTRICLE ANGIO: CPT

## 2018-06-12 PROCEDURE — 99157 MOD SED OTHER PHYS/QHP EA: CPT

## 2018-06-12 PROCEDURE — 99156 MOD SED OTH PHYS/QHP 5/>YRS: CPT

## 2018-06-13 NOTE — CATH
CC:  Dr. Gonzalez; Dr. Mackey

 

CATH REPORT:

 

DATE OF PROCEDURE:  06/12/18

 

PRIMARY CARE PHYSICIAN:  Dr. Gonzalez.

 

CARDIOLOGIST:  Dr. Mackey.

 

PROCEDURES:  Right radial artery access, bilateral selective coronary cineangiography, left heart cat
heterization, left ventriculography.

 

HISTORY:  A 68-year-old male with previous episode of exercise-induced syncope, recent exercise stres
s test which was positive in stage I, and demonstrated a drop in systolic blood pressure with exercis
e, although without chest pain.  He has never had angina.  The patient is here for solely diagnostic 
catheterization, having expressly declined any intervention as he is expecting to secure a second opi
nion if he has obstructive coronary disease.

 

PROCEDURE ACCESS:  Right radial artery sheath 6F slender.

 

MEDICATIONS:

1.  Subcu lidocaine.

2.  IV Versed.

3.  IV fentanyl.

4.  Heparin 3000 units.

5.  Verapamil 3 mg.

6.  Nitroglycerin 300 mcg.

 

DIAGNOSTIC CATHETER:  5F TIG4 and 5 pigtail.

 

HEMODYNAMICS:  Initial /75, LV 97/3-19, no aortic valve gradient on pullback.

 

ANGIOGRAPHY: Left main:  The left main is normal, has no stenosis.

 

LAD:  The LAD is moderate, has a 90% stenosis involving the origin of a moderate- sized diagonal as w
ell as the continuation of the LAD, which is smaller than the diagonal as well as a septal 
, the LAD has slow flow, has distal right-to- left collateral filling.  This is a 111 bifurcation les
ion.

 

Circumflex:  The circumflex is large, not dominant, with a small first marginal, large second margina
l, small posterolateral.  The circumflex has no significant stenosis.

 

RCA:  The RCA is large, dominant, smooth, without stenosis, supplies a large PDA, which provides a ri
ght-to-left collateral to the apical LAD.

 

LV gram:  LV wall motion is normal, estimated ejection fraction is 60%, there was no mitral regurgita
tion.

 

CONCLUSION:

1.  Single-vessel disease LAD with complex bifurcation lesion.

2.  Normal LV systolic function.

3.  Normal left-sided hemodynamics.

4.  Successful right radial artery access.

5.  The patient will be seeking a second opinion regarding bypass grafting versus complex PCI.  In th
e interim, Lipitor 80, Brilinta 90 b.i.d., p.r.n. nitroglycerin were added to his regimen.  He was in
structed to avoid all strenuous physical exertion.

 

 [id number]/CPS #: [id number]